# Patient Record
Sex: FEMALE
[De-identification: names, ages, dates, MRNs, and addresses within clinical notes are randomized per-mention and may not be internally consistent; named-entity substitution may affect disease eponyms.]

---

## 2017-03-25 ENCOUNTER — RX RENEWAL (OUTPATIENT)
Age: 67
End: 2017-03-25

## 2017-04-01 ENCOUNTER — APPOINTMENT (OUTPATIENT)
Dept: CARDIOLOGY | Facility: CLINIC | Age: 67
End: 2017-04-01

## 2017-06-10 ENCOUNTER — APPOINTMENT (OUTPATIENT)
Dept: CARDIOLOGY | Facility: CLINIC | Age: 67
End: 2017-06-10
Payer: SELF-PAY

## 2017-06-10 ENCOUNTER — NON-APPOINTMENT (OUTPATIENT)
Age: 67
End: 2017-06-10

## 2017-06-10 VITALS
RESPIRATION RATE: 12 BRPM | HEART RATE: 74 BPM | WEIGHT: 164 LBS | SYSTOLIC BLOOD PRESSURE: 133 MMHG | HEIGHT: 63 IN | BODY MASS INDEX: 29.06 KG/M2 | DIASTOLIC BLOOD PRESSURE: 88 MMHG | TEMPERATURE: 98.2 F | OXYGEN SATURATION: 98 %

## 2017-06-10 VITALS — DIASTOLIC BLOOD PRESSURE: 82 MMHG | SYSTOLIC BLOOD PRESSURE: 128 MMHG | HEART RATE: 72 BPM

## 2017-06-10 DIAGNOSIS — R51 HEADACHE: ICD-10-CM

## 2017-06-10 PROCEDURE — 99214 OFFICE O/P EST MOD 30 MIN: CPT | Mod: 25

## 2017-06-10 PROCEDURE — 93000 ELECTROCARDIOGRAM COMPLETE: CPT

## 2017-06-11 LAB
25(OH)D3 SERPL-MCNC: 24.7 NG/ML
ALBUMIN SERPL ELPH-MCNC: 4.8 G/DL
ALP BLD-CCNC: 58 U/L
ALT SERPL-CCNC: 29 U/L
ANION GAP SERPL CALC-SCNC: 19 MMOL/L
AST SERPL-CCNC: 29 U/L
BILIRUB SERPL-MCNC: 0.3 MG/DL
BUN SERPL-MCNC: 18 MG/DL
CALCIUM SERPL-MCNC: 9.7 MG/DL
CHLORIDE SERPL-SCNC: 103 MMOL/L
CHOLEST SERPL-MCNC: 199 MG/DL
CHOLEST/HDLC SERPL: 2.8 RATIO
CO2 SERPL-SCNC: 19 MMOL/L
CREAT SERPL-MCNC: 0.79 MG/DL
GLUCOSE SERPL-MCNC: 92 MG/DL
HBA1C MFR BLD HPLC: 5.3 %
HDLC SERPL-MCNC: 70 MG/DL
LDLC SERPL CALC-MCNC: 116 MG/DL
POTASSIUM SERPL-SCNC: 4 MMOL/L
PROT SERPL-MCNC: 8.3 G/DL
SODIUM SERPL-SCNC: 141 MMOL/L
TRIGL SERPL-MCNC: 64 MG/DL
TSH SERPL-ACNC: 2.12 UIU/ML

## 2017-06-12 LAB
BASOPHILS # BLD AUTO: 0.02 K/UL
BASOPHILS NFR BLD AUTO: 0.4 %
EOSINOPHIL # BLD AUTO: 0.21 K/UL
EOSINOPHIL NFR BLD AUTO: 4.3 %
HCT VFR BLD CALC: 42 %
HGB BLD-MCNC: 13.2 G/DL
IMM GRANULOCYTES NFR BLD AUTO: 0.2 %
LYMPHOCYTES # BLD AUTO: 1.3 K/UL
LYMPHOCYTES NFR BLD AUTO: 26.8 %
MAN DIFF?: NORMAL
MCHC RBC-ENTMCNC: 29.5 PG
MCHC RBC-ENTMCNC: 31.4 GM/DL
MCV RBC AUTO: 94 FL
MONOCYTES # BLD AUTO: 0.43 K/UL
MONOCYTES NFR BLD AUTO: 8.9 %
NEUTROPHILS # BLD AUTO: 2.88 K/UL
NEUTROPHILS NFR BLD AUTO: 59.4 %
PLATELET # BLD AUTO: 156 K/UL
RBC # BLD: 4.47 M/UL
RBC # FLD: 14.7 %
WBC # FLD AUTO: 4.85 K/UL

## 2017-06-27 ENCOUNTER — MEDICATION RENEWAL (OUTPATIENT)
Age: 67
End: 2017-06-27

## 2017-07-26 ENCOUNTER — APPOINTMENT (OUTPATIENT)
Dept: CARDIOLOGY | Facility: CLINIC | Age: 67
End: 2017-07-26

## 2018-02-05 ENCOUNTER — APPOINTMENT (OUTPATIENT)
Dept: ORTHOPEDIC SURGERY | Facility: CLINIC | Age: 68
End: 2018-02-05

## 2019-02-26 ENCOUNTER — APPOINTMENT (OUTPATIENT)
Dept: CARDIOLOGY | Facility: CLINIC | Age: 69
End: 2019-02-26

## 2019-04-08 ENCOUNTER — APPOINTMENT (OUTPATIENT)
Dept: CARDIOLOGY | Facility: CLINIC | Age: 69
End: 2019-04-08

## 2019-04-12 ENCOUNTER — APPOINTMENT (OUTPATIENT)
Dept: CARDIOLOGY | Facility: CLINIC | Age: 69
End: 2019-04-12
Payer: MEDICARE

## 2019-04-12 ENCOUNTER — NON-APPOINTMENT (OUTPATIENT)
Age: 69
End: 2019-04-12

## 2019-04-12 VITALS
HEIGHT: 63 IN | DIASTOLIC BLOOD PRESSURE: 86 MMHG | SYSTOLIC BLOOD PRESSURE: 150 MMHG | RESPIRATION RATE: 12 BRPM | OXYGEN SATURATION: 98 % | BODY MASS INDEX: 27.64 KG/M2 | WEIGHT: 156 LBS | HEART RATE: 66 BPM

## 2019-04-12 VITALS — HEART RATE: 72 BPM | SYSTOLIC BLOOD PRESSURE: 144 MMHG | DIASTOLIC BLOOD PRESSURE: 90 MMHG

## 2019-04-12 DIAGNOSIS — R42 DIZZINESS AND GIDDINESS: ICD-10-CM

## 2019-04-12 LAB
25(OH)D3 SERPL-MCNC: 31.1 NG/ML
ALBUMIN SERPL ELPH-MCNC: 4.8 G/DL
ALP BLD-CCNC: 64 U/L
ALT SERPL-CCNC: 29 U/L
ANION GAP SERPL CALC-SCNC: 14 MMOL/L
APPEARANCE: CLEAR
AST SERPL-CCNC: 26 U/L
BILIRUB SERPL-MCNC: 0.5 MG/DL
BILIRUBIN URINE: NEGATIVE
BLOOD URINE: NEGATIVE
BUN SERPL-MCNC: 17 MG/DL
CALCIUM SERPL-MCNC: 9.7 MG/DL
CHLORIDE SERPL-SCNC: 103 MMOL/L
CHOLEST SERPL-MCNC: 282 MG/DL
CHOLEST/HDLC SERPL: 4.3 RATIO
CO2 SERPL-SCNC: 27 MMOL/L
COLOR: NORMAL
CREAT SERPL-MCNC: 0.74 MG/DL
ESTIMATED AVERAGE GLUCOSE: 103 MG/DL
GLUCOSE QUALITATIVE U: NEGATIVE
GLUCOSE SERPL-MCNC: 100 MG/DL
HBA1C MFR BLD HPLC: 5.2 %
HDLC SERPL-MCNC: 66 MG/DL
KETONES URINE: NEGATIVE
LDLC SERPL CALC-MCNC: 196 MG/DL
LEUKOCYTE ESTERASE URINE: NEGATIVE
NITRITE URINE: NEGATIVE
PH URINE: 7.5
POTASSIUM SERPL-SCNC: 4.2 MMOL/L
PROT SERPL-MCNC: 7.5 G/DL
PROTEIN URINE: NEGATIVE
SODIUM SERPL-SCNC: 144 MMOL/L
SPECIFIC GRAVITY URINE: 1.02
TRIGL SERPL-MCNC: 99 MG/DL
TSH SERPL-ACNC: 2.59 UIU/ML
UROBILINOGEN URINE: NORMAL

## 2019-04-12 PROCEDURE — 99214 OFFICE O/P EST MOD 30 MIN: CPT | Mod: 25

## 2019-04-12 PROCEDURE — 93000 ELECTROCARDIOGRAM COMPLETE: CPT

## 2019-04-12 NOTE — PHYSICAL EXAM
[General Appearance - Well Developed] : well developed [Normal Appearance] : normal appearance [Well Groomed] : well groomed [General Appearance - Well Nourished] : well nourished [No Deformities] : no deformities [General Appearance - In No Acute Distress] : no acute distress [Normal Conjunctiva] : the conjunctiva exhibited no abnormalities [Normal Oral Mucosa] : normal oral mucosa [No Oral Pallor] : no oral pallor [Normal Jugular Venous A Waves Present] : normal jugular venous A waves present [No Oral Cyanosis] : no oral cyanosis [Normal Jugular Venous V Waves Present] : normal jugular venous V waves present [No Jugular Venous Enamorado A Waves] : no jugular venous enamorado A waves [Respiration, Rhythm And Depth] : normal respiratory rhythm and effort [Auscultation Breath Sounds / Voice Sounds] : lungs were clear to auscultation bilaterally [Exaggerated Use Of Accessory Muscles For Inspiration] : no accessory muscle use [Bowel Sounds] : normal bowel sounds [Abdomen Tenderness] : non-tender [Abdomen Soft] : soft [Abnormal Walk] : normal gait [Gait - Sufficient For Exercise Testing] : the gait was sufficient for exercise testing [Nail Clubbing] : no clubbing of the fingernails [Cyanosis, Localized] : no localized cyanosis [Skin Color & Pigmentation] : normal skin color and pigmentation [Petechial Hemorrhages (___cm)] : no petechial hemorrhages [] : no rash [No Skin Ulcers] : no skin ulcer [Skin Lesions] : no skin lesions [Oriented To Time, Place, And Person] : oriented to person, place, and time [Mood] : the mood was normal [Affect] : the affect was normal [No Anxiety] : not feeling anxious [Normal Rate] : normal [Normal S1] : normal S1 [Rhythm Regular] : regular [Normal S2] : normal S2 [No Murmur] : no murmurs heard [No Pitting Edema] : no pitting edema present [FreeTextEntry1] : Extraocular muscles intact. Anicteric sclerae. [S3] : no S3 [Right Carotid Bruit] : no bruit heard over the right carotid [Left Carotid Bruit] : no bruit heard over the left carotid [Bruit] : no bruit heard

## 2019-04-12 NOTE — DISCUSSION/SUMMARY
[FreeTextEntry1] : IMPRESSION: Mrs. Merritt is a 68-year-old woman with a history of hyperlipidemia, former tobacco use, nonobstructive CAD, tachycardia, HTN, and family history of coronary artery disease who presents today for evaluation of HTN.\par \par PLAN:\par 1. Her blood pressure is elevated, however, she feels that she can modify her diet for now. She states that she has been exercising 3-4 times a week and working with a . If her blood pressure remains elevated we can try her again on Metoprolol.  I have asked her to schedule an echocardiogram given her HTN. \par 2. She states that she has not been taking Crestor. I will be checking a CMP and lipid profile, however, based on past results, she will most likely need to start back on Crestor 10mg daily. Her goal LDL is around 70 given her coronary atherosclerosis. She should also be taking ASA 81mg daily.\par 3. I have asked her to schedule a Carotid Doppler given her headaches and dizziness.\par 4. She will follow up with me in 1 month for follow up of her blood pressure or sooner should she experience any symptoms in the interim.

## 2019-04-12 NOTE — HISTORY OF PRESENT ILLNESS
[FreeTextEntry1] : Patient is a 68 year old woman with a history of hyperlipidemia, former tobacco use, coronary atherosclerosis, tachycardia, HTN, and family history of CAD who presents today for evaluation of her blood pressure. She states that her blood pressure has been elevated recently. She states that she has not been watching her diet recently. She has experienced headaches, dizziness, and humming in her ears. She states that she has been experiencing occasional palpitations that are unchanged. She otherwise denies any chest pain and dyspnea.

## 2019-04-15 ENCOUNTER — MEDICATION RENEWAL (OUTPATIENT)
Age: 69
End: 2019-04-15

## 2019-04-15 LAB
BASOPHILS # BLD AUTO: 0.03 K/UL
BASOPHILS NFR BLD AUTO: 0.7 %
EOSINOPHIL # BLD AUTO: 0.11 K/UL
EOSINOPHIL NFR BLD AUTO: 2.4 %
HCT VFR BLD CALC: 44.1 %
HGB BLD-MCNC: 14.2 G/DL
IMM GRANULOCYTES NFR BLD AUTO: 0.2 %
LYMPHOCYTES # BLD AUTO: 1.44 K/UL
LYMPHOCYTES NFR BLD AUTO: 31.7 %
MAN DIFF?: NORMAL
MCHC RBC-ENTMCNC: 29.5 PG
MCHC RBC-ENTMCNC: 32.2 GM/DL
MCV RBC AUTO: 91.7 FL
MONOCYTES # BLD AUTO: 0.43 K/UL
MONOCYTES NFR BLD AUTO: 9.5 %
NEUTROPHILS # BLD AUTO: 2.52 K/UL
NEUTROPHILS NFR BLD AUTO: 55.5 %
PLATELET # BLD AUTO: 164 K/UL
RBC # BLD: 4.81 M/UL
RBC # FLD: 13.6 %
WBC # FLD AUTO: 4.54 K/UL

## 2019-04-22 ENCOUNTER — MEDICATION RENEWAL (OUTPATIENT)
Age: 69
End: 2019-04-22

## 2019-04-24 ENCOUNTER — APPOINTMENT (OUTPATIENT)
Dept: CARDIOLOGY | Facility: CLINIC | Age: 69
End: 2019-04-24
Payer: MEDICARE

## 2019-04-24 PROCEDURE — 93880 EXTRACRANIAL BILAT STUDY: CPT

## 2019-05-15 ENCOUNTER — APPOINTMENT (OUTPATIENT)
Dept: CARDIOLOGY | Facility: CLINIC | Age: 69
End: 2019-05-15

## 2019-05-22 ENCOUNTER — APPOINTMENT (OUTPATIENT)
Dept: CARDIOLOGY | Facility: CLINIC | Age: 69
End: 2019-05-22
Payer: MEDICARE

## 2019-05-22 PROCEDURE — 93306 TTE W/DOPPLER COMPLETE: CPT

## 2019-06-19 ENCOUNTER — APPOINTMENT (OUTPATIENT)
Dept: CARDIOLOGY | Facility: CLINIC | Age: 69
End: 2019-06-19

## 2019-08-23 RX ORDER — GLUCOSAMINE HCL 500 MG
100 TABLET ORAL
Qty: 30 | Refills: 0 | Status: ACTIVE | COMMUNITY
Start: 2019-08-23 | End: 1900-01-01

## 2019-08-23 RX ORDER — SIMVASTATIN 10 MG/1
10 TABLET, FILM COATED ORAL
Qty: 30 | Refills: 0 | Status: DISCONTINUED | COMMUNITY
Start: 2019-06-19 | End: 2019-08-23

## 2019-09-03 ENCOUNTER — NON-APPOINTMENT (OUTPATIENT)
Age: 69
End: 2019-09-03

## 2019-09-03 ENCOUNTER — APPOINTMENT (OUTPATIENT)
Dept: CARDIOLOGY | Facility: CLINIC | Age: 69
End: 2019-09-03
Payer: MEDICARE

## 2019-09-03 VITALS
DIASTOLIC BLOOD PRESSURE: 75 MMHG | OXYGEN SATURATION: 99 % | HEART RATE: 74 BPM | SYSTOLIC BLOOD PRESSURE: 112 MMHG | RESPIRATION RATE: 12 BRPM | HEIGHT: 63 IN | WEIGHT: 160 LBS | BODY MASS INDEX: 28.35 KG/M2 | TEMPERATURE: 98.4 F

## 2019-09-03 DIAGNOSIS — E78.5 HYPERLIPIDEMIA, UNSPECIFIED: ICD-10-CM

## 2019-09-03 DIAGNOSIS — I10 ESSENTIAL (PRIMARY) HYPERTENSION: ICD-10-CM

## 2019-09-03 LAB
ESTIMATED AVERAGE GLUCOSE: 103 MG/DL
HBA1C MFR BLD HPLC: 5.2 %
TSH SERPL-ACNC: 3.1 UIU/ML

## 2019-09-03 PROCEDURE — 99214 OFFICE O/P EST MOD 30 MIN: CPT | Mod: 25

## 2019-09-03 PROCEDURE — 93000 ELECTROCARDIOGRAM COMPLETE: CPT

## 2019-09-03 NOTE — HISTORY OF PRESENT ILLNESS
[FreeTextEntry1] : Patient is a 69 year old woman with a history of hyperlipidemia, former tobacco use, coronary atherosclerosis, tachycardia, HTN, and family history of CAD who presents today for follow up of her blood pressure and cholesterol and risk stratification prior to removal of a lipoma from her back. After experiencing muscle pain with other statins, she has been taking Atorvastatin 5mg daily for the past two weeks along with CoQ-10 100mg for hyperlipidemia. She reports tolerating the 5mg well with no side effects. She has injured her left knee and is unable to exercise. She states that she has been experiencing rare occasional palpitations when she is upset that are unchanged. She otherwise denies any chest pain, dyspnea, headaches or dizziness. She states that she is able to go up several flights of stairs and do her housework with no limitations.

## 2019-09-03 NOTE — PHYSICAL EXAM
[General Appearance - Well Developed] : well developed [Normal Appearance] : normal appearance [Well Groomed] : well groomed [General Appearance - Well Nourished] : well nourished [No Deformities] : no deformities [General Appearance - In No Acute Distress] : no acute distress [Normal Conjunctiva] : the conjunctiva exhibited no abnormalities [Normal Oral Mucosa] : normal oral mucosa [No Oral Pallor] : no oral pallor [No Oral Cyanosis] : no oral cyanosis [Normal Jugular Venous A Waves Present] : normal jugular venous A waves present [Normal Jugular Venous V Waves Present] : normal jugular venous V waves present [Respiration, Rhythm And Depth] : normal respiratory rhythm and effort [Exaggerated Use Of Accessory Muscles For Inspiration] : no accessory muscle use [Auscultation Breath Sounds / Voice Sounds] : lungs were clear to auscultation bilaterally [Normal Rate] : normal [Rhythm Regular] : regular [Normal S1] : normal S1 [Normal S2] : normal S2 [No Murmur] : no murmurs heard [No Pitting Edema] : no pitting edema present [Abdomen Soft] : soft [Abdomen Tenderness] : non-tender [Abnormal Walk] : normal gait [Nail Clubbing] : no clubbing of the fingernails [Cyanosis, Localized] : no localized cyanosis [Petechial Hemorrhages (___cm)] : no petechial hemorrhages [Skin Color & Pigmentation] : normal skin color and pigmentation [] : no rash [Affect] : the affect was normal [Oriented To Time, Place, And Person] : oriented to person, place, and time [Mood] : the mood was normal [No Anxiety] : not feeling anxious [FreeTextEntry1] : Extraocular muscles intact. Anicteric sclerae. [Right Carotid Bruit] : no bruit heard over the right carotid [Left Carotid Bruit] : no bruit heard over the left carotid [Bruit] : no bruit heard [Bowel Sounds] : normal bowel sounds [No Skin Ulcers] : no skin ulcer

## 2019-09-03 NOTE — REASON FOR VISIT
[Hyperlipidemia] : hyperlipidemia [Hypertension] : hypertension [FreeTextEntry1] : risk stratification prior to lipoma removal

## 2019-09-03 NOTE — DISCUSSION/SUMMARY
[FreeTextEntry1] : IMPRESSION: Mrs. Merritt is a 69-year-old woman with a history of hyperlipidemia, former tobacco use, nonobstructive CAD, tachycardia, HTN, and family history of coronary artery disease who presents today for evaluation of HTN and HLD.\par \par PLAN:\par 1. Her blood pressure is well controlled, thus she will continue on a low sodium diet for now. \par 2. She will continue on Atorvastatin 5mg daily along with CoQ-10 for her hyperlipidemia.  I will be checking a CMP and lipid profile today. Her goal LDL is around 70 given her coronary atherosclerosis. She should also be taking ASA 81mg daily.\par 3. She will follow up with me in 6 months or sooner should she experience any symptoms in the interim.\par 4. She will have complete blood work today prior to her lipoma removal next week.\par 5. She has intermediate clinical risk predictors for lipoma removal. Her ECG that was performed today is normal and she does not complain of any chest pain or dyspnea. She may proceed with her surgery from the cardiac standpoint without any further workup.

## 2019-09-05 LAB
25(OH)D3 SERPL-MCNC: 33.8 NG/ML
ALBUMIN SERPL ELPH-MCNC: 4.7 G/DL
ALP BLD-CCNC: 60 U/L
ALT SERPL-CCNC: 25 U/L
ANION GAP SERPL CALC-SCNC: 16 MMOL/L
APPEARANCE: CLEAR
APTT BLD: 32.4 SEC
AST SERPL-CCNC: 26 U/L
BILIRUB SERPL-MCNC: 0.5 MG/DL
BILIRUBIN URINE: NEGATIVE
BLOOD URINE: NEGATIVE
BUN SERPL-MCNC: 17 MG/DL
CALCIUM SERPL-MCNC: 9.5 MG/DL
CHLORIDE SERPL-SCNC: 103 MMOL/L
CHOLEST SERPL-MCNC: 213 MG/DL
CHOLEST/HDLC SERPL: 3.6 RATIO
CO2 SERPL-SCNC: 24 MMOL/L
COLOR: NORMAL
CREAT SERPL-MCNC: 0.74 MG/DL
GLUCOSE QUALITATIVE U: NEGATIVE
GLUCOSE SERPL-MCNC: 88 MG/DL
HDLC SERPL-MCNC: 59 MG/DL
INR PPP: 0.97 RATIO
KETONES URINE: NEGATIVE
LDLC SERPL CALC-MCNC: 128 MG/DL
LEUKOCYTE ESTERASE URINE: NEGATIVE
MAGNESIUM SERPL-MCNC: 2.1 MG/DL
NITRITE URINE: NEGATIVE
PH URINE: 7
POTASSIUM SERPL-SCNC: 3.7 MMOL/L
PROT SERPL-MCNC: 7.4 G/DL
PROTEIN URINE: NEGATIVE
PT BLD: 11.1 SEC
SODIUM SERPL-SCNC: 143 MMOL/L
SPECIFIC GRAVITY URINE: 1.02
TRIGL SERPL-MCNC: 129 MG/DL
UROBILINOGEN URINE: NORMAL

## 2019-09-06 LAB
BASOPHILS # BLD AUTO: 0.02 K/UL
BASOPHILS NFR BLD AUTO: 0.5 %
EOSINOPHIL # BLD AUTO: 0.13 K/UL
EOSINOPHIL NFR BLD AUTO: 3 %
HCT VFR BLD CALC: 38.8 %
HGB BLD-MCNC: 12.8 G/DL
IMM GRANULOCYTES NFR BLD AUTO: 0.2 %
LYMPHOCYTES # BLD AUTO: 1.3 K/UL
LYMPHOCYTES NFR BLD AUTO: 30.1 %
MAN DIFF?: NORMAL
MCHC RBC-ENTMCNC: 29.9 PG
MCHC RBC-ENTMCNC: 33 GM/DL
MCV RBC AUTO: 90.7 FL
MONOCYTES # BLD AUTO: 0.43 K/UL
MONOCYTES NFR BLD AUTO: 10 %
NEUTROPHILS # BLD AUTO: 2.43 K/UL
NEUTROPHILS NFR BLD AUTO: 56.2 %
PLATELET # BLD AUTO: 144 K/UL
RBC # BLD: 4.28 M/UL
RBC # FLD: 13.7 %
WBC # FLD AUTO: 4.32 K/UL

## 2019-09-25 ENCOUNTER — APPOINTMENT (OUTPATIENT)
Dept: INTERNAL MEDICINE | Facility: CLINIC | Age: 69
End: 2019-09-25

## 2019-10-03 ENCOUNTER — APPOINTMENT (OUTPATIENT)
Dept: ORTHOPEDIC SURGERY | Facility: CLINIC | Age: 69
End: 2019-10-03
Payer: MEDICARE

## 2019-10-03 VITALS — HEART RATE: 80 BPM | SYSTOLIC BLOOD PRESSURE: 130 MMHG | DIASTOLIC BLOOD PRESSURE: 75 MMHG

## 2019-10-03 DIAGNOSIS — M17.12 UNILATERAL PRIMARY OSTEOARTHRITIS, LEFT KNEE: ICD-10-CM

## 2019-10-03 PROCEDURE — 99214 OFFICE O/P EST MOD 30 MIN: CPT

## 2019-10-03 PROCEDURE — 73564 X-RAY EXAM KNEE 4 OR MORE: CPT | Mod: LT

## 2019-10-03 NOTE — ADDENDUM
[FreeTextEntry1] : This note was written by Jojo Aguilera on 10/03/2019 acting solely as a scribe for Dr. Juan Muniz.\par \par All medical record entries made by the Scribe were at my, Dr. Juan Muniz, direction and personally dictated by me on 10/03/2019. I have personally reviewed the chart and agree that the record accurately reflects my personal performance of the history, physical exam, assessment and plan.\par

## 2019-10-03 NOTE — HISTORY OF PRESENT ILLNESS
[de-identified] : 69 year old female present today with left knee pain x 1 month. She may have injured her knee lifting weights at the gym. She states that her pain has improved since the injury.   The pain is brought on after sitting for prolonged period of time. She states that her pain has improved since the injury. Denies locking, buckling,numbness or tingling.

## 2019-10-03 NOTE — PHYSICAL EXAM
[de-identified] : Oriented to time, place, person\par Mood: Normal\par Affect: Normal\par Appearance: Healthy, well appearing, no acute distress.\par Gait: Normal\par Assistive Devices: None\par \par Left knee exam\par \par Skin: Clean, dry, intact\par Inspection: No obvious malalignment, no masses, no swelling, no effusion, mild varus deformity\par Pulses: 2+ DP/PT pulses\par ROM: 0-135 degrees of flexion. No pain with deep knee flexion/extension.\par Tenderness: + MJLT. No LJLT. No pain over the patella facets. No pain to the quadriceps tendon. No pain to the patella tendon. No posterior knee tenderness.\par Stability: Stable to varus, valgus. Negative lachman testing. Negative anterior drawer, negative posterior drawer.\par Strength: 5/5 Q/H/TA/GS/EHL, without atrophy\par Neuro: In tact to light touch throughout, DTR's normal\par Additional tests: Negative McMurrays test, Negative patellar grind test.  [de-identified] : The following radiographs were ordered and read by me during this patients visit. I reviewed each radiograph in detail with the patient and discussed the findings as highlighted below. \par \par 4 views of the left knee were obtained today, 10/03/2019, that show no acute fracture or dislocation. There is moderate medial, no lateral and mild patellofemoral degenerative change seen. Quadriceps enthesopathy and mild varus deformity present. There is no significant malalignment. No significant other obvious osseous abnormality, otherwise unremarkable.\par

## 2019-10-03 NOTE — DISCUSSION/SUMMARY
[de-identified] : 69 year old female presents with left knee pain.\par \par Presentation of painful ROM consistent with arthritis.Symptoms appear to be radiating to the medial compartment with some mild varus deformity as well as collapse of the medial joint. Symptoms are improving at this time. The risks and benefits of each treatment option was discussed and all questions were answered. At this time recommendations are for conservative and symptomatic care as detailed above with impact loading activity restriction, low impact exercise and avoidance of strenuous activity.\par  \par Other recommendations include OTC NSAIDs or acetaminophen (if tolerated/able), with application of ice to the knee 2-3x daily for 20 minutes or after periods of activity. Activity modifications as discussed; including low impact activity.\par  \par Follow up as needed.\par

## 2020-04-13 ENCOUNTER — RX RENEWAL (OUTPATIENT)
Age: 70
End: 2020-04-13

## 2020-10-29 ENCOUNTER — APPOINTMENT (OUTPATIENT)
Dept: CARDIOLOGY | Facility: CLINIC | Age: 70
End: 2020-10-29

## 2021-03-29 ENCOUNTER — RESULT REVIEW (OUTPATIENT)
Age: 71
End: 2021-03-29

## 2023-10-25 ENCOUNTER — APPOINTMENT (OUTPATIENT)
Dept: ORTHOPEDIC SURGERY | Facility: CLINIC | Age: 73
End: 2023-10-25
Payer: MEDICARE

## 2023-10-25 DIAGNOSIS — M17.0 BILATERAL PRIMARY OSTEOARTHRITIS OF KNEE: ICD-10-CM

## 2023-10-25 DIAGNOSIS — M17.12 UNILATERAL PRIMARY OSTEOARTHRITIS, LEFT KNEE: ICD-10-CM

## 2023-10-25 PROCEDURE — 99213 OFFICE O/P EST LOW 20 MIN: CPT

## 2023-10-25 PROCEDURE — 73564 X-RAY EXAM KNEE 4 OR MORE: CPT | Mod: RT

## 2023-10-26 PROBLEM — M17.12 PRIMARY LOCALIZED OSTEOARTHRITIS OF LEFT KNEE: Status: ACTIVE | Noted: 2023-10-26

## 2023-10-26 PROBLEM — M17.0 PRIMARY LOCALIZED OSTEOARTHRITIS OF BOTH KNEES: Status: ACTIVE | Noted: 2023-10-26

## 2023-11-06 ENCOUNTER — APPOINTMENT (OUTPATIENT)
Dept: ORTHOPEDIC SURGERY | Facility: CLINIC | Age: 73
End: 2023-11-06

## 2023-11-21 ENCOUNTER — APPOINTMENT (OUTPATIENT)
Dept: ORTHOPEDIC SURGERY | Facility: CLINIC | Age: 73
End: 2023-11-21

## 2024-04-08 ENCOUNTER — APPOINTMENT (OUTPATIENT)
Dept: ORTHOPEDIC SURGERY | Facility: CLINIC | Age: 74
End: 2024-04-08
Payer: MEDICARE

## 2024-04-08 DIAGNOSIS — M54.9 DORSALGIA, UNSPECIFIED: ICD-10-CM

## 2024-04-08 PROCEDURE — 99214 OFFICE O/P EST MOD 30 MIN: CPT

## 2024-04-08 NOTE — HISTORY OF PRESENT ILLNESS
[de-identified] : Patient is a 73-year-old female who presents for initial eval of RT lower bp radiating down RT LE. Sxs were radiating into hip. Able to walk 5 blocks. Details active lifestyle.

## 2024-04-08 NOTE — ADDENDUM
[FreeTextEntry1] :  I, Le Ngo, acted solely as a scribe for Dr. Rupert Morales MD on this date 04/08/2024   All medical record entries made by the Scribe were at my, Dr. Rupert Morales MD., direction and personally dictated by me on 04/08/2024. I have reviewed the chart and agree that the record accurately reflects my personal performance of the history, physical exam, assessment and plan. I have also personally directed, reviewed, and agreed with the chart.

## 2024-04-08 NOTE — PHYSICAL EXAM
[de-identified] : Lumbar Physical Exam   Gait - Normal  Station - Normal   Sagittal balance - Normal   Compensatory mechanism? - None   Heel walk - Normal   Toe walk - Normal     Reflexes    Patellar - normal    Gastroc - normal    Clonus - No    Hip Exam - Normal   Straight leg raise - none   Pulses - 2+ dp/pt   Range of motion - normal    Sensation   Sensation intact to light touch in L1, L2, L3, L4, L5 and S1 dermatomes bilaterally     Motor             IP Quad HS TA Gastroc EHL   Right 5/5  5/5   5/5 5/5    5/5     5/5   Left   5/5 5/5 5/5 5/5    5/5      5/5  [de-identified] : Scoliosis Rads  SVA wnl  L3-L4 spondylolisthesis facet arthropathy

## 2024-04-08 NOTE — ASSESSMENT
[FreeTextEntry1] : I had a lengthy discussion with the patient in regard to treatment plan and diagnosis. There are no red flag findings on imaging nor are there any red flag findings on clinical exams. Therefore, we will proceed with a course of conservative treatment. This would include physical therapy/home exercise program, Tylenol, NSAIDs as medically indicated. The patient will follow up with me in approximately 4 to 6 weeks. I encouraged the patient to follow-up sooner if there are any new or worsening symptoms.

## 2024-07-22 ENCOUNTER — APPOINTMENT (OUTPATIENT)
Dept: ORTHOPEDIC SURGERY | Facility: CLINIC | Age: 74
End: 2024-07-22
Payer: MEDICARE

## 2024-07-22 DIAGNOSIS — M54.9 DORSALGIA, UNSPECIFIED: ICD-10-CM

## 2024-07-22 PROCEDURE — 73502 X-RAY EXAM HIP UNI 2-3 VIEWS: CPT | Mod: RT

## 2024-07-22 PROCEDURE — 99213 OFFICE O/P EST LOW 20 MIN: CPT

## 2024-07-22 RX ORDER — HYALURONATE SODIUM 20 MG/2 ML
20 SYRINGE (ML) INTRAARTICULAR
Qty: 3 | Refills: 1 | Status: ACTIVE | COMMUNITY
Start: 2024-07-22

## 2024-07-22 NOTE — ASSESSMENT
[FreeTextEntry1] :  A discussion was had with the patient regarding symptomatic treatment for lower back pain.Recommendations; Begin trial of PT, Rx givenConservative care & observation, this includes rest/activity avoidance until less symptomatic with subsequent gradual return to full activity as directed. Patient may also use OTC NSAID's or acetaminophen as tolerated, application of ice to the area 2-3x daily for 20 minutes after periods of activity, and elevate limb during periods of rest.  Follow up with Dr. Morales

## 2024-07-24 NOTE — HISTORY OF PRESENT ILLNESS
[de-identified] : 74 year old female presents today with right posterior/lateral hip/lumbar pain x 2 months. No injury reported. The pain is brought on with certain movements. Localizes pain to the lower back and occasionally radiates to the groin and posterior thigh. C/o occasional tingling down her thigh. Patient was seen by Dr. Morales for lower back pain.

## 2024-07-24 NOTE — PHYSICAL EXAM
[de-identified] : Left Hip Exam:  Skin: Clean/dry and intact Inspection: No obvious deformity, no swelling. Pulses: 2+ DP/PT pulses ROM: Flexion to 130 degrees, internal rotation 70. External rotation to 45. Painful ROM: None, No groin pain.  Tenderness: +tenderness over greater trochanter. +tenderness at gluteal insertion. No paraspinal tenderness. No axial lumbar tenderness.mild sacroiliac tenderness. No ttp over the ASIS/[place] crest. Strength: 5/5 hip flexion, 5/5 Gluteal strength,5/5 hip ADD, 5/5 hip ABD, 5/5 Q/H, 5/5 TA/GS/EHL Neuro: Sensation in tact to light touch throughout, DTR normal Additional tests: Negative impingement test, Negative LATISHA [de-identified] :  The following radiographs were ordered and read by me during this patients visit. I reviewed each radiograph in detail with the patient and discussed the findings as highlighted below.   AP pelvis and lateral view of the right hip were obtained today, 07/22/2024, that show no acute bony injury, including fracture or dislocation. There is no hip degenerative change seen. There is no gross pelvic of hip malalignment. No significant other obvious osseous abnormality, otherwise unremarkable.  Multiple views of the lumbar were obtained 04//8/2024, that show L3-4 listhesis, diffuse degenerative disc disease

## 2024-07-24 NOTE — HISTORY OF PRESENT ILLNESS
[de-identified] : 74 year old female presents today with right posterior/lateral hip/lumbar pain x 2 months. No injury reported. The pain is brought on with certain movements. Localizes pain to the lower back and occasionally radiates to the groin and posterior thigh. C/o occasional tingling down her thigh. Patient was seen by Dr. Morales for lower back pain.

## 2024-07-24 NOTE — DISCUSSION/SUMMARY
[de-identified] : 74-year-old female with lumbar pain/hip pain   Patient presents for evaluation of right hip/lumbar pain.  Radiographs of the right hip suggest no internal derangement or evidence of arthrosis.  Patient does have pre-existing known lumbar anterolisthesis and diffuse degenerative disc disease managed by Dr. Morales.  I discussed that some of her radiating discomfort may be related to lumbar pathology, and is unlikely related to any intra-articular hip pathology.  Patient has some muscular discomfort over the posterior lateral hip that may be consistent with local gluteal tendinopathy and strain.   Recommendations; local heat/ice.  NSAIDs as needed.  May consider physical therapy if symptoms persist.   Orthospine follow-up as needed

## 2024-07-24 NOTE — REASON FOR VISIT
[Clear Rhinorrhea] : clear rhinorrhea [NL] : warm, clear [FreeTextEntry5] : Left eye red with discharge [Follow-Up Visit] : a follow-up visit for [FreeTextEntry2] : right hip pain

## 2024-07-24 NOTE — PHYSICAL EXAM
[de-identified] : Left Hip Exam:  Skin: Clean/dry and intact Inspection: No obvious deformity, no swelling. Pulses: 2+ DP/PT pulses ROM: Flexion to 130 degrees, internal rotation 70. External rotation to 45. Painful ROM: None, No groin pain.  Tenderness: +tenderness over greater trochanter. +tenderness at gluteal insertion. No paraspinal tenderness. No axial lumbar tenderness.mild sacroiliac tenderness. No ttp over the ASIS/[place] crest. Strength: 5/5 hip flexion, 5/5 Gluteal strength,5/5 hip ADD, 5/5 hip ABD, 5/5 Q/H, 5/5 TA/GS/EHL Neuro: Sensation in tact to light touch throughout, DTR normal Additional tests: Negative impingement test, Negative LATISHA [de-identified] :  The following radiographs were ordered and read by me during this patients visit. I reviewed each radiograph in detail with the patient and discussed the findings as highlighted below.   AP pelvis and lateral view of the right hip were obtained today, 07/22/2024, that show no acute bony injury, including fracture or dislocation. There is no hip degenerative change seen. There is no gross pelvic of hip malalignment. No significant other obvious osseous abnormality, otherwise unremarkable.  Multiple views of the lumbar were obtained 04//8/2024, that show L3-4 listhesis, diffuse degenerative disc disease

## 2024-07-24 NOTE — DISCUSSION/SUMMARY
[de-identified] : 74-year-old female with lumbar pain/hip pain   Patient presents for evaluation of right hip/lumbar pain.  Radiographs of the right hip suggest no internal derangement or evidence of arthrosis.  Patient does have pre-existing known lumbar anterolisthesis and diffuse degenerative disc disease managed by Dr. Morales.  I discussed that some of her radiating discomfort may be related to lumbar pathology, and is unlikely related to any intra-articular hip pathology.  Patient has some muscular discomfort over the posterior lateral hip that may be consistent with local gluteal tendinopathy and strain.   Recommendations; local heat/ice.  NSAIDs as needed.  May consider physical therapy if symptoms persist.   Orthospine follow-up as needed

## 2025-06-26 ENCOUNTER — APPOINTMENT (OUTPATIENT)
Dept: OBGYN | Facility: CLINIC | Age: 75
End: 2025-06-26

## 2025-07-08 ENCOUNTER — NON-APPOINTMENT (OUTPATIENT)
Age: 75
End: 2025-07-08

## 2025-07-08 ENCOUNTER — APPOINTMENT (OUTPATIENT)
Dept: OBGYN | Facility: CLINIC | Age: 75
End: 2025-07-08
Payer: MEDICARE

## 2025-07-08 VITALS
DIASTOLIC BLOOD PRESSURE: 86 MMHG | SYSTOLIC BLOOD PRESSURE: 145 MMHG | WEIGHT: 170 LBS | BODY MASS INDEX: 30.5 KG/M2 | HEIGHT: 62.5 IN

## 2025-07-08 PROBLEM — Z01.419 WELL WOMAN EXAM WITH ROUTINE GYNECOLOGICAL EXAM: Status: ACTIVE | Noted: 2025-07-08

## 2025-07-08 PROCEDURE — G0101: CPT

## 2025-07-08 PROCEDURE — G0328 FECAL BLOOD SCRN IMMUNOASSAY: CPT | Mod: QW

## 2025-07-08 PROCEDURE — G0444 DEPRESSION SCREEN ANNUAL: CPT

## 2025-07-11 LAB
CYTOLOGY CVX/VAG DOC THIN PREP: ABNORMAL
HPV HIGH+LOW RISK DNA PNL CVX: NOT DETECTED

## 2025-07-22 ENCOUNTER — APPOINTMENT (OUTPATIENT)
Dept: OBGYN | Facility: CLINIC | Age: 75
End: 2025-07-22
Payer: MEDICARE

## 2025-07-22 PROCEDURE — 77080 DXA BONE DENSITY AXIAL: CPT
